# Patient Record
Sex: FEMALE | Race: WHITE | URBAN - METROPOLITAN AREA
[De-identification: names, ages, dates, MRNs, and addresses within clinical notes are randomized per-mention and may not be internally consistent; named-entity substitution may affect disease eponyms.]

---

## 2023-04-05 LAB
AVERAGE GLUCOSE: 123
HBA1C MFR BLD: 5.9 %

## 2023-07-06 DIAGNOSIS — F43.23 ADJUSTMENT DISORDER WITH MIXED ANXIETY AND DEPRESSED MOOD: Primary | ICD-10-CM

## 2023-07-07 PROBLEM — R53.82 CHRONIC FATIGUE: Status: ACTIVE | Noted: 2023-07-07

## 2023-07-07 PROBLEM — F41.9 ANXIETY: Status: ACTIVE | Noted: 2023-07-07

## 2023-07-07 PROBLEM — E03.9 ACQUIRED HYPOTHYROIDISM: Status: ACTIVE | Noted: 2023-07-07

## 2023-07-07 PROBLEM — Z78.9 NON-SMOKER: Status: ACTIVE | Noted: 2023-07-07

## 2023-07-07 PROBLEM — F51.04 PSYCHOPHYSIOLOGICAL INSOMNIA: Status: ACTIVE | Noted: 2023-07-07

## 2023-07-07 PROBLEM — K64.1 GRADE II HEMORRHOIDS: Status: ACTIVE | Noted: 2023-07-07

## 2023-07-07 PROBLEM — D12.6 TUBULAR ADENOMA OF COLON: Status: ACTIVE | Noted: 2023-07-07

## 2023-07-07 PROBLEM — K57.30 DIVERTICULOSIS OF LARGE INTESTINE WITHOUT PERFORATION OR ABSCESS WITHOUT BLEEDING: Status: ACTIVE | Noted: 2023-07-07

## 2023-07-07 PROBLEM — J30.89 NON-SEASONAL ALLERGIC RHINITIS: Status: ACTIVE | Noted: 2023-07-07

## 2023-07-07 PROBLEM — E55.9 VITAMIN D DEFICIENCY: Status: ACTIVE | Noted: 2023-07-07

## 2023-07-07 PROBLEM — D50.9 IRON DEFICIENCY ANEMIA, UNSPECIFIED: Status: ACTIVE | Noted: 2023-07-07

## 2023-07-07 PROBLEM — R73.03 PRE-DIABETES: Status: ACTIVE | Noted: 2023-07-07

## 2023-07-07 PROBLEM — L71.9 ROSACEA: Status: ACTIVE | Noted: 2023-07-07

## 2023-07-07 PROBLEM — F43.23 ADJUSTMENT DISORDER WITH MIXED ANXIETY AND DEPRESSED MOOD: Status: ACTIVE | Noted: 2023-07-07

## 2023-07-07 PROBLEM — M17.0 PRIMARY OSTEOARTHRITIS OF BOTH KNEES: Status: ACTIVE | Noted: 2023-07-07

## 2023-07-07 RX ORDER — FERROUS SULFATE 325(65) MG
325 TABLET ORAL EVERY OTHER DAY
COMMUNITY

## 2023-07-07 RX ORDER — ARIPIPRAZOLE 5 MG/1
TABLET ORAL
Qty: 30 TABLET | Refills: 1 | Status: SHIPPED | OUTPATIENT
Start: 2023-07-07 | End: 2023-08-06

## 2023-07-07 RX ORDER — FLUOXETINE HYDROCHLORIDE 40 MG/1
40 CAPSULE ORAL DAILY
COMMUNITY

## 2023-07-07 RX ORDER — LEVOTHYROXINE SODIUM 0.2 MG/1
200 TABLET ORAL DAILY
COMMUNITY

## 2023-12-04 LAB
ESTIMATED AVERAGE GLUCOSE: 114
HBA1C MFR BLD: 5.6 %

## 2024-01-23 ENCOUNTER — OFFICE VISIT (OUTPATIENT)
Age: 59
End: 2024-01-23
Payer: COMMERCIAL

## 2024-01-23 VITALS
TEMPERATURE: 98.2 F | SYSTOLIC BLOOD PRESSURE: 92 MMHG | DIASTOLIC BLOOD PRESSURE: 54 MMHG | RESPIRATION RATE: 14 BRPM | WEIGHT: 217 LBS | HEART RATE: 89 BPM

## 2024-01-23 DIAGNOSIS — R11.0 NAUSEA: ICD-10-CM

## 2024-01-23 DIAGNOSIS — L85.3 DRY SKIN: ICD-10-CM

## 2024-01-23 DIAGNOSIS — R09.81 NASAL CONGESTION: ICD-10-CM

## 2024-01-23 DIAGNOSIS — J06.9 VIRAL URI: Primary | ICD-10-CM

## 2024-01-23 DIAGNOSIS — E86.0 DEHYDRATION: ICD-10-CM

## 2024-01-23 DIAGNOSIS — R52 BODY ACHES: ICD-10-CM

## 2024-01-23 PROCEDURE — 99212 OFFICE O/P EST SF 10 MIN: CPT

## 2024-01-23 RX ORDER — FLUTICASONE PROPIONATE 50 MCG
2 SPRAY, SUSPENSION (ML) NASAL DAILY
Qty: 16 G | Refills: 0 | Status: SHIPPED | OUTPATIENT
Start: 2024-01-23

## 2024-01-23 RX ORDER — ONDANSETRON 4 MG/1
4 TABLET, FILM COATED ORAL EVERY 8 HOURS PRN
Qty: 15 TABLET | Refills: 0 | Status: SHIPPED | OUTPATIENT
Start: 2024-01-23 | End: 2024-01-28

## 2024-01-23 SDOH — ECONOMIC STABILITY: FOOD INSECURITY: WITHIN THE PAST 12 MONTHS, YOU WORRIED THAT YOUR FOOD WOULD RUN OUT BEFORE YOU GOT MONEY TO BUY MORE.: NEVER TRUE

## 2024-01-23 SDOH — ECONOMIC STABILITY: HOUSING INSECURITY
IN THE LAST 12 MONTHS, WAS THERE A TIME WHEN YOU DID NOT HAVE A STEADY PLACE TO SLEEP OR SLEPT IN A SHELTER (INCLUDING NOW)?: NO

## 2024-01-23 SDOH — ECONOMIC STABILITY: INCOME INSECURITY: HOW HARD IS IT FOR YOU TO PAY FOR THE VERY BASICS LIKE FOOD, HOUSING, MEDICAL CARE, AND HEATING?: NOT HARD AT ALL

## 2024-01-23 SDOH — ECONOMIC STABILITY: FOOD INSECURITY: WITHIN THE PAST 12 MONTHS, THE FOOD YOU BOUGHT JUST DIDN'T LAST AND YOU DIDN'T HAVE MONEY TO GET MORE.: NEVER TRUE

## 2024-01-23 NOTE — PROGRESS NOTES
[facility]  [address] [place] [address]  Phone: [phone]  Fax: [phone]      Date of Visit:  2024  Patient Name: Iva Holloway   Patient :  1965     Assessment:     1. Viral URI    2. Nasal congestion    3. Nausea    4. Dehydration    5. Body aches    6. Dry skin        Plan:      Problem List:  1.  Viral URI, nasal congestion, nausea, dehydration, body aches (with occasional cough)-no fevers, appears nontoxic  -Clinically dehydrated- on exam patient has tenting of the skin 1.5 seconds and mouth is dry-no symptoms of lightheadedness dizziness, blood pressure is 92/54 however, states she is normally on the lower side with blood pressure.    -Instructed to drink at minimum 8 8 ounces of fluid daily and to include electrolytes like Pedialyte, Gatorade  -Zofran short course sent to pharmacy to help with nausea so patient can increase oral intake of fluid and food.  -Recommended discussed use of nasal saline rinse with Charlene pot or NeilMed kit twice daily for the next 3 to 5 days then daily afterwards with salt packets that come with Fela/[place] pot and patient to use distilled or boiled water-cool to body temperature.  -Ibuprofen 600 mg every 6 hours as needed and or Tylenol up to 3000 mg from all sources within the 24-hour period.  -Recommend other supportive care including hot tea with honey, being sure to get good sleep, rest.     -Patient to return/call the office if she has worsening symptoms of chest pain, shortness of breath, lightheadedness, dizziness, heart palpitations, or other concerning symptoms.    ======    2. -Dry skin  -Few areas of dry skin over chest  -Recommend patient dry well after showering do not keep skin wet   - use a good-quality cream like Aquaphor or CeraVe   -if worsens or does not improve patient instructed to call the office for reevaluation    -Patient has an upcoming appointment with

## 2024-01-23 NOTE — PATIENT INSTRUCTIONS
Thank you for coming in today it was nice to meet you.  You likely have a viral upper respiratory infection, with nasal congestion and nausea.  You are also clinically dehydrated.     As discussed be sure to drink plenty of fluids, pedialyte and gatorade  and be sure to have electrolytes included in diet and drinks. Drink at minimum 8 (8 ounces) drinks daily.    -Zofran/ondansetron was ordered for nausea-take as needed for nausea for the next few days so you can tolerate and get used to eating again.  It is important to have enough protein and calories for healing.    -I recommend you use a [place] pot nasal saline rinse or NeilMed kit for saline rinse-do this twice daily for the next 3 to 5 days then once daily until your nasal congestion resolves.  Use the salt packets that come with a [place] pot or NeilMed kit.  as discussed use distilled or boiled water be sure it is cool enough so you do not harm your nasal passages.  And you can do this over your sink with head tilt to the side then gently blow your nose and do the other side of your nose  -For aches and pains you can take 600 mg ibuprofen every 6 hours as needed, you can also take Tylenol up to 3000 mg from all sources within a 24-hour period.   -You can drink honey with tea, sleep, activity as tolerated can help as well.    For the dry skin on your chest you can be sure to dry well after showering do not keep skin wet and use a good-quality cream like Aquaphor or CeraVe -if this worsens or does not improve call the office and we can reevaluate.    -These colds have been lasting a long time and if you have concerning and worsening symptoms please call the office and return for reevaluation.    If you have any questions or concerns please call or contact the office.